# Patient Record
Sex: FEMALE | Race: WHITE | Employment: FULL TIME | URBAN - METROPOLITAN AREA
[De-identification: names, ages, dates, MRNs, and addresses within clinical notes are randomized per-mention and may not be internally consistent; named-entity substitution may affect disease eponyms.]

---

## 2017-07-24 ENCOUNTER — TELEPHONE (OUTPATIENT)
Dept: PHYSICAL MEDICINE AND REHAB | Age: 47
End: 2017-07-24

## 2017-07-25 ENCOUNTER — OFFICE VISIT (OUTPATIENT)
Dept: PHYSICAL MEDICINE AND REHAB | Age: 47
End: 2017-07-25

## 2017-07-25 DIAGNOSIS — G56.03 BILATERAL CARPAL TUNNEL SYNDROME: ICD-10-CM

## 2017-07-25 DIAGNOSIS — M79.602 PARESTHESIA AND PAIN OF BOTH UPPER EXTREMITIES: ICD-10-CM

## 2017-07-25 DIAGNOSIS — R20.2 PARESTHESIA AND PAIN OF BOTH UPPER EXTREMITIES: ICD-10-CM

## 2017-07-25 DIAGNOSIS — M79.601 PARESTHESIA AND PAIN OF BOTH UPPER EXTREMITIES: ICD-10-CM

## 2017-07-25 DIAGNOSIS — M54.12 C7 RADICULOPATHY: Primary | ICD-10-CM

## 2017-07-25 PROCEDURE — 95886 MUSC TEST DONE W/N TEST COMP: CPT | Performed by: PHYSICAL MEDICINE & REHABILITATION

## 2017-07-25 PROCEDURE — 95909 NRV CNDJ TST 5-6 STUDIES: CPT | Performed by: PHYSICAL MEDICINE & REHABILITATION

## 2017-08-22 ENCOUNTER — HOSPITAL ENCOUNTER (OUTPATIENT)
Dept: PHYSICAL THERAPY | Age: 47
Discharge: HOME | End: 2017-08-31
Attending: FAMILY MEDICINE | Admitting: FAMILY MEDICINE

## 2017-08-22 ASSESSMENT — PAIN DESCRIPTION - PROGRESSION: CLINICAL_PROGRESSION: GRADUALLY WORSENING

## 2017-08-22 ASSESSMENT — PAIN DESCRIPTION - ONSET: ONSET: GRADUAL

## 2017-08-22 ASSESSMENT — PAIN DESCRIPTION - ORIENTATION: ORIENTATION: POSTERIOR

## 2017-08-22 ASSESSMENT — PAIN DESCRIPTION - PAIN TYPE: TYPE: CHRONIC PAIN

## 2017-08-22 ASSESSMENT — PAIN DESCRIPTION - LOCATION: LOCATION: NECK

## 2017-08-22 ASSESSMENT — PAIN DESCRIPTION - FREQUENCY: FREQUENCY: CONTINUOUS

## 2017-08-22 ASSESSMENT — PAIN SCALES - GENERAL: PAINLEVEL_OUTOF10: 1

## 2017-09-01 ENCOUNTER — HOSPITAL ENCOUNTER (OUTPATIENT)
Dept: OTHER | Age: 47
Discharge: HOME | End: 2017-09-30
Attending: FAMILY MEDICINE | Admitting: FAMILY MEDICINE

## 2017-10-01 ENCOUNTER — HOSPITAL ENCOUNTER (OUTPATIENT)
Dept: OTHER | Age: 47
Discharge: HOME | End: 2017-10-30
Attending: FAMILY MEDICINE | Admitting: FAMILY MEDICINE

## 2020-08-31 ENCOUNTER — HOSPITAL ENCOUNTER (OUTPATIENT)
Dept: NEUROLOGY | Age: 50
Discharge: HOME | End: 2020-08-31
Payer: COMMERCIAL

## 2020-08-31 PROCEDURE — 95886 MUSC TEST DONE W/N TEST COMP: CPT | Performed by: PHYSICAL MEDICINE & REHABILITATION

## 2020-08-31 PROCEDURE — 95911 NRV CNDJ TEST 9-10 STUDIES: CPT | Performed by: PHYSICAL MEDICINE & REHABILITATION

## 2020-08-31 PROCEDURE — 95886 MUSC TEST DONE W/N TEST COMP: CPT

## 2020-08-31 PROCEDURE — 95861 NEEDLE EMG 2 EXTREMITIES: CPT

## 2020-08-31 PROCEDURE — 95911 NRV CNDJ TEST 9-10 STUDIES: CPT

## 2020-08-31 NOTE — PROCEDURES
Risks and benefits of study discussed. Specific and common risks of pain and bleeding, as well as uncommon side effects of infection, hematoma, vasovagal episodes. Patient agreeable to testing and consents to such. Clinical: pain in left heel/ankle, 8 months, occasional paresthesia but no fixed numbness    Motor NCS:  Normal tibial and peroneal amplitudes, latencies, and CVs bilateral    Sensory NCS:  Normal sural and peroneal responses bilateral  Normal left medial and lateral plantar responses    Needle EMG: normal throughout left & right lower limbs      Impression:    1) Normal findings, without evidence of tibial neuropathy at the ankle/tarsal tunnel, nor any evidence suggesting radiculopathy, plexopathy, generalized neuropahty.

## 2020-10-27 RX ORDER — BETAMETHASONE DIPROPIONATE 0.5 MG/G
OINTMENT TOPICAL
COMMUNITY
Start: 2019-08-06

## 2020-10-27 RX ORDER — ESTRADIOL 0.5 MG/1
0.5 TABLET ORAL DAILY
COMMUNITY
Start: 2019-05-17

## 2020-10-27 RX ORDER — FLUOCINONIDE 0.5 MG/G
OINTMENT TOPICAL 2 TIMES DAILY
COMMUNITY
Start: 2019-05-28

## 2020-10-27 RX ORDER — MELOXICAM 15 MG/1
TABLET ORAL
COMMUNITY
Start: 2020-07-29

## 2020-10-28 ENCOUNTER — HOSPITAL ENCOUNTER (OUTPATIENT)
Dept: INFUSION THERAPY | Age: 50
Discharge: HOME | End: 2020-10-28
Payer: COMMERCIAL

## 2020-10-28 ENCOUNTER — INITIAL CONSULT (OUTPATIENT)
Dept: ONCOLOGY | Age: 50
End: 2020-10-28
Payer: COMMERCIAL

## 2020-10-28 VITALS
OXYGEN SATURATION: 99 % | HEART RATE: 65 BPM | SYSTOLIC BLOOD PRESSURE: 111 MMHG | TEMPERATURE: 97.7 F | RESPIRATION RATE: 16 BRPM | WEIGHT: 112 LBS | BODY MASS INDEX: 19.12 KG/M2 | DIASTOLIC BLOOD PRESSURE: 64 MMHG | HEIGHT: 64 IN

## 2020-10-28 DIAGNOSIS — R58 BLEEDING: ICD-10-CM

## 2020-10-28 DIAGNOSIS — R23.3 ABNORMAL BRUISING: ICD-10-CM

## 2020-10-28 LAB
ALBUMIN SERPL-MCNC: 4.9 GM/DL (ref 3.4–5)
ALP BLD-CCNC: 43 IU/L (ref 40–129)
ALT SERPL-CCNC: 7 U/L (ref 10–40)
ANION GAP SERPL CALCULATED.3IONS-SCNC: 14 MMOL/L (ref 4–16)
APTT: 26.4 SECONDS (ref 25.1–37.1)
AST SERPL-CCNC: 16 IU/L (ref 15–37)
BASOPHILS ABSOLUTE: 0 K/CU MM
BASOPHILS RELATIVE PERCENT: 0.4 % (ref 0–1)
BILIRUB SERPL-MCNC: 0.4 MG/DL (ref 0–1)
BUN BLDV-MCNC: 19 MG/DL (ref 6–23)
CALCIUM SERPL-MCNC: 9.5 MG/DL (ref 8.3–10.6)
CHLORIDE BLD-SCNC: 100 MMOL/L (ref 99–110)
CO2: 26 MMOL/L (ref 21–32)
CREAT SERPL-MCNC: 0.7 MG/DL (ref 0.6–1.1)
DIFFERENTIAL TYPE: ABNORMAL
EOSINOPHILS ABSOLUTE: 0.1 K/CU MM
EOSINOPHILS RELATIVE PERCENT: 1.1 % (ref 0–3)
ERYTHROCYTE SEDIMENTATION RATE: 4 MM/HR (ref 0–30)
GFR AFRICAN AMERICAN: >60 ML/MIN/1.73M2
GFR NON-AFRICAN AMERICAN: >60 ML/MIN/1.73M2
GLUCOSE BLD-MCNC: 76 MG/DL (ref 70–99)
HCT VFR BLD CALC: 41.6 % (ref 37–47)
HEMOGLOBIN: 13.8 GM/DL (ref 12.5–16)
INR BLD: 0.93 INDEX
LACTATE DEHYDROGENASE: 170 IU/L (ref 120–246)
LYMPHOCYTES ABSOLUTE: 2 K/CU MM
LYMPHOCYTES RELATIVE PERCENT: 35.5 % (ref 24–44)
MCH RBC QN AUTO: 32.4 PG (ref 27–31)
MCHC RBC AUTO-ENTMCNC: 33.2 % (ref 32–36)
MCV RBC AUTO: 97.7 FL (ref 78–100)
MONOCYTES ABSOLUTE: 0.4 K/CU MM
MONOCYTES RELATIVE PERCENT: 7.4 % (ref 0–4)
PDW BLD-RTO: 12.9 % (ref 11.7–14.9)
PLATELET # BLD: 248 K/CU MM (ref 140–440)
PMV BLD AUTO: 9.8 FL (ref 7.5–11.1)
POTASSIUM SERPL-SCNC: 4.2 MMOL/L (ref 3.5–5.1)
PROTHROMBIN TIME: 11.2 SECONDS (ref 11.7–14.5)
RBC # BLD: 4.26 M/CU MM (ref 4.2–5.4)
SEGMENTED NEUTROPHILS ABSOLUTE COUNT: 3.1 K/CU MM
SEGMENTED NEUTROPHILS RELATIVE PERCENT: 55.6 % (ref 36–66)
SODIUM BLD-SCNC: 140 MMOL/L (ref 135–145)
TOTAL PROTEIN: 7.2 GM/DL (ref 6.4–8.2)
WBC # BLD: 5.6 K/CU MM (ref 4–10.5)

## 2020-10-28 PROCEDURE — 85246 CLOT FACTOR VIII VW ANTIGEN: CPT

## 2020-10-28 PROCEDURE — 85245 CLOT FACTOR VIII VW RISTOCTN: CPT

## 2020-10-28 PROCEDURE — 85730 THROMBOPLASTIN TIME PARTIAL: CPT

## 2020-10-28 PROCEDURE — 85610 PROTHROMBIN TIME: CPT

## 2020-10-28 PROCEDURE — 99201 HC NEW PT, OUTPT VISIT LEVEL 1: CPT

## 2020-10-28 PROCEDURE — 80053 COMPREHEN METABOLIC PANEL: CPT

## 2020-10-28 PROCEDURE — 85247 CLOT FACTOR VIII MULTIMETRIC: CPT

## 2020-10-28 PROCEDURE — 99204 OFFICE O/P NEW MOD 45 MIN: CPT | Performed by: INTERNAL MEDICINE

## 2020-10-28 PROCEDURE — 83615 LACTATE (LD) (LDH) ENZYME: CPT

## 2020-10-28 PROCEDURE — 85240 CLOT FACTOR VIII AHG 1 STAGE: CPT

## 2020-10-28 PROCEDURE — 85652 RBC SED RATE AUTOMATED: CPT

## 2020-10-28 PROCEDURE — 36415 COLL VENOUS BLD VENIPUNCTURE: CPT

## 2020-10-28 PROCEDURE — 85025 COMPLETE CBC W/AUTO DIFF WBC: CPT

## 2020-10-28 RX ORDER — VIT C/B6/B5/MAGNESIUM/HERB 173 50-5-6-5MG
CAPSULE ORAL
COMMUNITY

## 2020-10-28 ASSESSMENT — PATIENT HEALTH QUESTIONNAIRE - PHQ9
SUM OF ALL RESPONSES TO PHQ QUESTIONS 1-9: 0
SUM OF ALL RESPONSES TO PHQ9 QUESTIONS 1 & 2: 0
1. LITTLE INTEREST OR PLEASURE IN DOING THINGS: 0
2. FEELING DOWN, DEPRESSED OR HOPELESS: 0
SUM OF ALL RESPONSES TO PHQ QUESTIONS 1-9: 0
SUM OF ALL RESPONSES TO PHQ QUESTIONS 1-9: 0

## 2020-10-28 NOTE — PROGRESS NOTES
MA Rooming Questions  Patient: Michaela Molina  MRN: [id number]    Date: 10/28/2020          3. Have you had any imaging done since your last visit? yes - CT, MRI and EMG       5. Did the patient have a depression screening completed today? Yes    PHQ-9 Total Score: 0 (10/28/2020  3:01 PM)       PHQ-9 Given to (if applicable):  Carlos               PHQ-9 Score (if applicable):                     [] Positive     [x]  Negative              Does question #9 need addressed (if applicable)                     [] Yes    [x]  No               Nakia Andino MA

## 2020-10-28 NOTE — PROGRESS NOTES
pancreatic cancer in her grandfather and bone cancer in her grandmother. No other pertinent family history. No family history of bleeding disorder. No Known Allergies    Current Outpatient Medications on File Prior to Visit   Medication Sig Dispense Refill    Multiple Vitamins-Minerals (EMERGEN-C IMMUNE PO) Take by mouth      Turmeric 500 MG CAPS Take by mouth      estradiol (ESTRACE) 0.5 MG tablet Take 0.5 mg by mouth daily      Estriol-Progesterone Micro 4-20 MG/GM CREA Place onto the skin      Calcium Carb-Cholecalciferol 600-100 MG-UNIT CAPS Take by mouth      MULTIPLE VITAMIN PO Take by mouth      augmented betamethasone dipropionate (DIPROLENE-AF) 0.05 % ointment Apply to affected area twice daily. Avoid using on face, armpits, skin folds.  fluocinonide (LIDEX) 0.05 % ointment Apply topically 2 times daily      meloxicam (MOBIC) 15 MG tablet TAKE 1 TABLET BY MOUTH ONE TIME A DAY       No current facility-administered medications on file prior to visit. Review of Systems:  Constitutional:  No weight loss, No fever, No chills, No night sweats. Energy level fair. Eyes:  No diplopia, No transient or permanent loss of vision, No scotomata. ENT / Mouth:  No epistaxis, No dysphagia, No hoarseness, No oral ulcers, No gingival bleeding. No sore throat, No postnasal drip, No nasal drip, No mouth pain, No sinus pain, No tinnitus, Normal hearing. Cardiovascular:  No chest pain, No palpitations, No syncope, No upper extremity edema, No lower extremity edema, No calf discomfort. Respiratory:  No cough. No hemoptysis, No pleurisy, No wheezing, No dyspnea. Breast:  No breast mass, No pain, No nipple discharge, No change in size, No change in shape.   Gastrointestinal:  No abdominal pain, No abdominal cramping, No nausea, No vomiting, No constipation, No diarrhea, No hematochezia, No melena, No jaundice, No dyspepsia, No dysphagia. Urinary:  No dysuria, No hematuria, No urinary incontinence. Gynecological:  No vaginal discharge, No suprapubic pain, No abnormal vaginal bleeding. Musculoskeletal:  No muscle pain, No swollen joints, No joint redness, No bone pain, No spine tenderness. Skin:  No rash, No nodules, No pruritus, No lesions. Neurologic:  No confusion, No seizures, No syncope, No tremor, No speech change, No headache, No hiccups, No abnormal gait, No sensory changes, No weakness. Psychiatric:  No depression, No anxiety, Concentration normal.  Endocrine:  No polyuria, No polydipsia, No hot flashes, No thyroid symptoms. Hematologic:  No epistaxis, No gingival bleeding, No petechiae, old bruises noted. Lymphatic:  No lymphadenopathy, No lymphedema. Allergy / Immunologic:  No eczema, No frequent mucous infections, No frequent respiratory infections, No recurrent urticarial, No frequent skin infections.      Vital Signs: /64 (Site: Right Upper Arm, Position: Sitting, Cuff Size: Medium Adult)   Pulse 65   Temp 97.7 °F (36.5 °C) (Infrared)   Resp 16   Ht 5' 4\" (1.626 m)   Wt 112 lb (50.8 kg)   SpO2 99%   BMI 19.22 kg/m²      Physical Exam:  CONSTITUTIONAL: awake, alert, cooperative, no apparent distress   EYES: pupils equal, round and reactive to light, sclera clear and conjunctiva normal  ENT: Normocephalic, without obvious abnormality, atraumatic  NECK: supple, symmetrical, no jugular venous distension and no carotid bruits   HEMATOLOGIC/LYMPHATIC: no cervical, supraclavicular or axillary lymphadenopathy   LUNGS: VBS, no wheezes, no crackles, no rhonchi, no increased work of breathing and clear to auscultation   CARDIOVASCULAR: regular rate and rhythm, normal S1 and S2, no murmur noted  ABDOMEN: soft, non-distended, normal bowel sounds x 4, non-tender, no masses palpated, no hepatosplenomegaly   MUSCULOSKELETAL: full range of motion noted, tone is normal  NEUROLOGIC: awake, alert, oriented to name, place and time. Motor skills grossly intact. SKIN: Normal skin color, texture, turgor and no jaundice.  appears intact, old bruises noted  EXTREMITIES: no LE edema, no leg swelling, no cyanosis, no clubbing      Labs:  Hematology:  Lab Results   Component Value Date    WBC 5.6 10/28/2020    RBC 4.26 10/28/2020    HGB 13.8 10/28/2020    HCT 41.6 10/28/2020    MCV 97.7 10/28/2020    MCH 32.4 (H) 10/28/2020    MCHC 33.2 10/28/2020    RDW 12.9 10/28/2020     10/28/2020    MPV 9.8 10/28/2020    SEGSPCT 55.6 10/28/2020    EOSRELPCT 1.1 10/28/2020    BASOPCT 0.4 10/28/2020    LYMPHOPCT 35.5 10/28/2020    MONOPCT 7.4 (H) 10/28/2020    SEGSABS 3.1 10/28/2020    EOSABS 0.1 10/28/2020    BASOSABS 0.0 10/28/2020    LYMPHSABS 2.0 10/28/2020    MONOSABS 0.4 10/28/2020    DIFFTYPE AUTOMATED DIFFERENTIAL 10/28/2020     No results found for: ESR  Chemistry:  Lab Results   Component Value Date     10/28/2020    K 4.2 10/28/2020     10/28/2020    CO2 26 10/28/2020    BUN 19 10/28/2020    CREATININE 0.7 10/28/2020    GLUCOSE 76 10/28/2020    CALCIUM 9.5 10/28/2020    PROT 7.2 10/28/2020    LABALBU 4.9 10/28/2020    BILITOT 0.4 10/28/2020    ALKPHOS 43 10/28/2020    AST 16 10/28/2020    ALT 7 (L) 10/28/2020    LABGLOM >60 10/28/2020    GFRAA >60 10/28/2020     Lab Results   Component Value Date     10/28/2020     No components found for: LD  No results found for: TSHHS, T4FREE, FT3  Immunology:  Lab Results   Component Value Date    PROT 7.2 10/28/2020     No results found for: Preston Ahr, KLFLCR  No results found for: B2M  Coagulation Panel:  Lab Results   Component Value Date    PROTIME 11.2 (L) 10/28/2020    INR 0.93 10/28/2020    APTT 26.4 10/28/2020     Anemia Panel:  No results found for: Bari Ort, FOLATE  Tumor Markers:  No results found for: , CEA, , LABCA2, PSA     Observations:  PHQ-9 Total Score: 0 (10/28/2020  3:01 PM)     Assessment   Easy bruising    Plan: Josep Martin is a 75-year-old very pleasant female who was referred to me for evaluation of easy bruising off and on for about a year duration. She is not on any blood thinner or anti platelet medications. She started taking tumeric since 10/1/2020. She doesn't have previous history of bleeding disorder. No family history of bleeding disorder. She never had any surgery. Had tooth extraction when she was 5 and it didn't seem to have significant bleeding after that. She is menopause now and she denies menorrhagia before. I recommend to send proper work ups today, including CBC, PT/PTT/INR, von Willebrand panel and platelet function assay. I will see her back in three weeks. If all the above tests are normal, I will recommend for close observation. I have reviewed all these plans with her and she is in agreement with the plans. I answered all her questions and concerns for today. I asked her to follow up with primary care physician on regular basis. I will continue to keep you updated on her progress. Thank you for allowing me to participate in the care of this very pleasant patient. Recent imaging and labs were reviewed and discussed with the patient.

## 2020-11-01 LAB
FACTOR VIII ACTIVITY: 67 % (ref 56–191)
RISTOCETIN CO-FACTOR: 89 % (ref 51–215)
VON WILLEBRAND AG: 110 % (ref 52–214)

## 2020-11-03 LAB — VON WILLEBRAND MULTIMERS: NORMAL

## 2020-11-18 ENCOUNTER — OFFICE VISIT (OUTPATIENT)
Dept: ONCOLOGY | Age: 50
End: 2020-11-18
Payer: COMMERCIAL

## 2020-11-18 ENCOUNTER — HOSPITAL ENCOUNTER (OUTPATIENT)
Dept: INFUSION THERAPY | Age: 50
Discharge: HOME | End: 2020-11-18
Payer: COMMERCIAL

## 2020-11-18 VITALS
HEART RATE: 61 BPM | DIASTOLIC BLOOD PRESSURE: 55 MMHG | WEIGHT: 111.6 LBS | OXYGEN SATURATION: 97 % | BODY MASS INDEX: 19.05 KG/M2 | TEMPERATURE: 97.8 F | HEIGHT: 64 IN | SYSTOLIC BLOOD PRESSURE: 108 MMHG | RESPIRATION RATE: 18 BRPM

## 2020-11-18 PROCEDURE — 99213 OFFICE O/P EST LOW 20 MIN: CPT | Performed by: INTERNAL MEDICINE

## 2020-11-18 PROCEDURE — 99211 OFF/OP EST MAY X REQ PHY/QHP: CPT

## 2020-11-18 NOTE — PROGRESS NOTES
Patient Name:  Shannon Shin  Patient :  1970  Patient MRN:  [id number]     Primary Oncologist: Rosemarie Dumont MD  Referring Provider: Oscar Nj MD     Date of Service: 2020      Chief Complaint:    Chief Complaint   Patient presents with    Follow-up     Patient Active Problem List:     Abnormal bruising     Bleeding    HPI:   Shannon Shin is a 25-year-old very pleasant female with medical history significant for irritable bowel syndrome, sinusitis, osteoarthritis and history of skin cancer, referred to me on 2020 for evaluation of easy bruising. She stated that she has been having easy bruising off and on for about a year duration. She is not on any blood thinner or anti platelet medications. She started taking tumeric since 10/1/2020. She doesn't have previous history of bleeding disorder. No family history of bleeding disorder. She never had any surgery. Had tooth extraction when she was 5 and it didn't seem to have significant bleeding after that. She is menopause now and she denies menorrhagia before. Since she is noticed to have multiple bruises by dermatologist, she is referred to me for further evaluation. She is currently on estrogen replacement therapy and she has been followed by Dr. Mynor Gage. Laboratory work ups done on 10/28/20 showed normal complete blood count, normal PT/PTT/INR, normal factor VIII activity, normal von Willebrand antigen/ activity and von Willebrand multimers. On 2020, she presented to me for follow up. On today visit, I reviewed with her findings on laboratory tests. Since all the screening laboratory tests are within normal range and she only has minimal symptoms, I recommend for observation. If she develops major bleeding issue in future, I will consider to have additional testing, e.g platelet function assay, platelet aggregation study and electron microscopy.      Besides easy bruising, she doesn't have any other significant symptoms at today visit. Past Medical History:     Significant for   1. Irritable bowel syndrome  2. Acute sinusitis  3. Osteoarthritis  4. History of skin cancer                                                   Past Surgery History:    No pertinent surgical history. Social History:   She denies smoking or illicit drug abuse. She socially drinks alcohol. She is currently living in [facility]. Family History:    Significant for pancreatic cancer in her grandfather and bone cancer in her grandmother. No other pertinent family history. No family history of bleeding disorder. Allergies:  No known allergies     Review of Systems: \"Per interval history; otherwise 10 point ROS is negative. \"  Her energy level is good, appetite, and sleep are fine. She doesn't have fever, chills, night sweats, cough, shortness of breath, chest pain, hemoptysis or palpitations. Her bowel and bladder functions are normal. She denies nausea, vomiting, abdominal pain, diarrhea, constipation, dysuria, loss of appetite or weight loss. She doesn't have neuropathy and she denies bleeding or clotting issues. She doesn't have any pain in her body. No anxiety or depression. The rest of the systems are unremarkable.      Vital Signs: BP (!) 108/55 (Site: Left Upper Arm, Position: Sitting, Cuff Size: Medium Adult)   Pulse 61   Temp 97.8 °F (36.6 °C) (Infrared)   Resp 18   Ht 5' 4\" (1.626 m)   Wt 111 lb 9.6 oz (50.6 kg)   SpO2 97%   BMI 19.16 kg/m²      Physical Exam:  CONSTITUTIONAL: awake, alert, cooperative, no apparent distress   EYES: pupils equal, round and reactive to light, sclera clear and conjunctiva normal  ENT: Normocephalic, without obvious abnormality, atraumatic  NECK: supple, symmetrical, no jugular venous distension and no carotid bruits HEMATOLOGIC/LYMPHATIC: no cervical, supraclavicular or axillary lymphadenopathy   LUNGS: VBS, no rhonchi, no increased work of breathing and clear to auscultation, no wheezes, no crackles,    CARDIOVASCULAR: regular rate and rhythm, normal S1 and S2, no murmur noted  ABDOMEN: soft, non-distended, normal bowel sounds x 4, no masses palpated, no hepatosplenomegaly, non-tender,    MUSCULOSKELETAL: full range of motion noted, tone is normal  NEUROLOGIC: awake, alert, oriented to name, place and time. Motor skills grossly intact. SKIN: Normal skin color, texture, turgor and no jaundice.  appears intact, old bruises noted  EXTREMITIES: no leg swelling, no cyanosis, no LE edema, no clubbing      Labs:  Hematology:  Lab Results   Component Value Date    WBC 5.6 10/28/2020    RBC 4.26 10/28/2020    HGB 13.8 10/28/2020    HCT 41.6 10/28/2020    MCV 97.7 10/28/2020    MCH 32.4 (H) 10/28/2020    MCHC 33.2 10/28/2020    RDW 12.9 10/28/2020     10/28/2020    MPV 9.8 10/28/2020    SEGSPCT 55.6 10/28/2020    EOSRELPCT 1.1 10/28/2020    BASOPCT 0.4 10/28/2020    LYMPHOPCT 35.5 10/28/2020    MONOPCT 7.4 (H) 10/28/2020    SEGSABS 3.1 10/28/2020    EOSABS 0.1 10/28/2020    BASOSABS 0.0 10/28/2020    LYMPHSABS 2.0 10/28/2020    MONOSABS 0.4 10/28/2020    DIFFTYPE AUTOMATED DIFFERENTIAL 10/28/2020     Lab Results   Component Value Date    ESR 4 10/28/2020     Chemistry:  Lab Results   Component Value Date     10/28/2020    K 4.2 10/28/2020     10/28/2020    CO2 26 10/28/2020    BUN 19 10/28/2020    CREATININE 0.7 10/28/2020    GLUCOSE 76 10/28/2020    CALCIUM 9.5 10/28/2020    PROT 7.2 10/28/2020    LABALBU 4.9 10/28/2020    BILITOT 0.4 10/28/2020    ALKPHOS 43 10/28/2020    AST 16 10/28/2020    ALT 7 (L) 10/28/2020    LABGLOM >60 10/28/2020    GFRAA >60 10/28/2020     Lab Results   Component Value Date     10/28/2020     No components found for: LD  No results found for: TSHHS, T4FREE, FT3  Immunology:  Lab Results   Component Value Date    PROT 7.2 10/28/2020     No results found for: Mellissa Cohen, KLFLCR  No results found for: B2M  Coagulation Panel:  Lab Results   Component Value Date    PROTIME 11.2 (L) 10/28/2020    INR 0.93 10/28/2020    APTT 26.4 10/28/2020     Anemia Panel:  No results found for: Vjiaya Narciso, FOLATE  Tumor Markers:  No results found for: , CEA, , LABCA2, PSA     Observations:  No data recorded     Assessment   Easy bruising    Plan:                                                                                                       Adan Fernandez is a 57-year-old very pleasant female who was referred to me for evaluation of easy bruising off and on for about a year duration. She is not on any blood thinner or anti platelet medications. She started taking tumeric since 10/1/2020. She doesn't have previous history of bleeding disorder. No family history of bleeding disorder. She never had any surgery. Had tooth extraction when she was 5 and it didn't seem to have significant bleeding after that. She is menopause now and she denies menorrhagia before. Laboratory work ups done on 10/28/20 showed normal complete blood count, normal PT/PTT/INR, normal factor VIII activity, normal von Willebrand antigen/ activity and von Willebrand multimers. On November 18, 2020, she presented to me for follow up. On today visit, I reviewed with her findings on laboratory tests. Since all the screening laboratory tests are within normal range and she only has minimal symptoms, I recommend for observation. If she develops major bleeding issue in future, I will consider to have additional testing, e.g platelet function assay, platelet aggregation study and electron microscopy. I answered all her questions and concerns for today. I asked her to contact me if she has major bleeding in future.      I asked her to follow up with primary care physician on regular basis. Thank you for allowing me to participate in the care of this very pleasant patient. Recent imaging and labs were reviewed and discussed with the patient.

## 2020-11-18 NOTE — PROGRESS NOTES
MA Rooming Questions  Patient: Jose F Blanco  MRN: [id number]    Date: 11/18/2020        1. Do you have any new issues?   no         2. Do you need any refills on medications?    no    3. Have you had any imaging done since your last visit?   no    4. Have you been hospitalized or seen in the emergency room since your last visit here?   no    5. Did the patient have a depression screening completed today?  No    No data recorded     PHQ-9 Given to (if applicable):               PHQ-9 Score (if applicable):                     [] Positive     []  Negative              Does question #9 need addressed (if applicable)                     [] Yes    []  No               Jose Moran MA

## 2021-09-01 ENCOUNTER — HOSPITAL ENCOUNTER (OUTPATIENT)
Dept: WOMENS IMAGING | Age: 51
Discharge: HOME | End: 2021-09-01
Payer: COMMERCIAL

## 2021-09-01 DIAGNOSIS — Z13.820 SCREENING FOR OSTEOPOROSIS: ICD-10-CM

## 2021-09-01 DIAGNOSIS — Z12.31 ENCOUNTER FOR SCREENING MAMMOGRAM FOR MALIGNANT NEOPLASM OF BREAST: ICD-10-CM

## 2021-09-01 PROCEDURE — 77080 DXA BONE DENSITY AXIAL: CPT

## 2021-09-01 PROCEDURE — 77063 BREAST TOMOSYNTHESIS BI: CPT

## 2021-09-23 ENCOUNTER — HOSPITAL ENCOUNTER (OUTPATIENT)
Dept: ULTRASOUND IMAGING | Age: 51
Discharge: HOME | End: 2021-09-23
Payer: COMMERCIAL

## 2021-09-23 ENCOUNTER — HOSPITAL ENCOUNTER (OUTPATIENT)
Dept: WOMENS IMAGING | Age: 51
Discharge: HOME | End: 2021-09-23
Payer: COMMERCIAL

## 2021-09-23 DIAGNOSIS — R92.8 ABNORMAL FINDING ON BREAST IMAGING: ICD-10-CM

## 2021-09-23 PROCEDURE — G0279 TOMOSYNTHESIS, MAMMO: HCPCS

## 2021-09-23 PROCEDURE — 76642 ULTRASOUND BREAST LIMITED: CPT

## 2022-03-24 ENCOUNTER — HOSPITAL ENCOUNTER (OUTPATIENT)
Dept: ULTRASOUND IMAGING | Age: 52
Discharge: HOME | End: 2022-03-24
Payer: COMMERCIAL

## 2022-03-24 DIAGNOSIS — N64.59 ABNORMAL BREAST FINDING: ICD-10-CM

## 2022-03-24 PROCEDURE — 76642 ULTRASOUND BREAST LIMITED: CPT

## 2022-10-26 ENCOUNTER — HOSPITAL ENCOUNTER (OUTPATIENT)
Age: 52
Discharge: HOME | End: 2022-10-26
Payer: COMMERCIAL

## 2022-10-26 ENCOUNTER — HOSPITAL ENCOUNTER (OUTPATIENT)
Dept: GENERAL RADIOLOGY | Age: 52
Discharge: HOME | End: 2022-10-26
Payer: COMMERCIAL

## 2022-10-26 DIAGNOSIS — M54.89 BACK PAIN DUE TO INFLAMMATORY PROCESS: ICD-10-CM

## 2022-10-26 PROCEDURE — 72070 X-RAY EXAM THORAC SPINE 2VWS: CPT

## 2022-11-10 ENCOUNTER — HOSPITAL ENCOUNTER (OUTPATIENT)
Dept: ULTRASOUND IMAGING | Age: 52
Discharge: HOME | End: 2022-11-10
Payer: COMMERCIAL

## 2022-11-10 ENCOUNTER — HOSPITAL ENCOUNTER (OUTPATIENT)
Dept: WOMENS IMAGING | Age: 52
Discharge: HOME | End: 2022-11-10
Payer: COMMERCIAL

## 2022-11-10 DIAGNOSIS — R92.8 ABNORMAL MAMMOGRAM: ICD-10-CM

## 2022-11-10 PROCEDURE — 76642 ULTRASOUND BREAST LIMITED: CPT

## 2022-11-10 PROCEDURE — G0279 TOMOSYNTHESIS, MAMMO: HCPCS

## 2023-05-12 ENCOUNTER — HOSPITAL ENCOUNTER (OUTPATIENT)
Dept: ULTRASOUND IMAGING | Age: 53
Discharge: HOME | End: 2023-05-12
Payer: COMMERCIAL

## 2023-05-12 DIAGNOSIS — N64.59 ABNORMAL BREAST FINDING: ICD-10-CM

## 2023-05-12 PROCEDURE — 76642 ULTRASOUND BREAST LIMITED: CPT

## 2023-12-14 ENCOUNTER — HOSPITAL ENCOUNTER (OUTPATIENT)
Dept: WOMENS IMAGING | Age: 53
Discharge: HOME | End: 2023-12-14
Payer: COMMERCIAL

## 2023-12-14 VITALS — HEIGHT: 64 IN | BODY MASS INDEX: 18.78 KG/M2 | WEIGHT: 110 LBS

## 2023-12-14 DIAGNOSIS — Z12.31 ENCOUNTER FOR SCREENING MAMMOGRAM FOR MALIGNANT NEOPLASM OF BREAST: ICD-10-CM

## 2023-12-14 PROCEDURE — 77063 BREAST TOMOSYNTHESIS BI: CPT

## 2025-02-05 ENCOUNTER — HOSPITAL ENCOUNTER (OUTPATIENT)
Dept: WOMENS IMAGING | Age: 55
Discharge: HOME | End: 2025-02-05
Payer: COMMERCIAL

## 2025-02-05 DIAGNOSIS — Z12.31 ENCOUNTER FOR SCREENING MAMMOGRAM FOR BREAST CANCER: ICD-10-CM

## 2025-02-05 PROCEDURE — 77063 BREAST TOMOSYNTHESIS BI: CPT

## 2025-08-12 RX ORDER — PROGESTERONE 100 MG/1
1 CAPSULE ORAL DAILY
COMMUNITY